# Patient Record
Sex: MALE | Race: WHITE | Employment: OTHER | URBAN - METROPOLITAN AREA
[De-identification: names, ages, dates, MRNs, and addresses within clinical notes are randomized per-mention and may not be internally consistent; named-entity substitution may affect disease eponyms.]

---

## 2021-02-25 NOTE — PATIENT DISCUSSION
"""Lid everted OS. No foreign body found today. Patient a little irritated. "" ""Continue Artificial tears both eyes four times a day . """

## 2021-07-07 NOTE — PATIENT DISCUSSION
"""Lid everted OS. No foreign body found today. Patient a little irritated. "" ""Continue Artificial tears both eyes four times a day . ""."

## 2021-07-12 NOTE — PATIENT DISCUSSION
Start PF tears OU BID or more as needed. Discussed if over the counter drops do not seen to improve VA while doing night time activities will consider starting Restasis. Discussion on the impact LASIK and dry eye can have on vision, glare/halos, blurry, and distorted vision.

## 2022-05-12 NOTE — PATIENT DISCUSSION
NOT VISUALLY SIGNIFICANT: Informed patient that their cataract is not visually significant or does not meet the criteria for cataract surgery. Recommend attention to cataract symptoms monitoring by regular examinations. Patient instructed to call with changes in vision. [employer] Walk in

## 2023-04-04 ENCOUNTER — NEW PATIENT (OUTPATIENT)
Dept: URBAN - METROPOLITAN AREA CLINIC 26 | Facility: CLINIC | Age: 63
End: 2023-04-04

## 2023-04-04 VITALS
HEART RATE: 71 BPM | WEIGHT: 188 LBS | BODY MASS INDEX: 29.51 KG/M2 | DIASTOLIC BLOOD PRESSURE: 83 MMHG | HEIGHT: 67 IN | SYSTOLIC BLOOD PRESSURE: 120 MMHG

## 2023-04-04 DIAGNOSIS — H21.02: ICD-10-CM

## 2023-04-04 DIAGNOSIS — H04.123: ICD-10-CM

## 2023-04-04 DIAGNOSIS — H34.12: ICD-10-CM

## 2023-04-04 DIAGNOSIS — E11.9: ICD-10-CM

## 2023-04-04 DIAGNOSIS — H40.9: ICD-10-CM

## 2023-04-04 PROCEDURE — 92250 FUNDUS PHOTOGRAPHY W/I&R: CPT

## 2023-04-04 PROCEDURE — 92134 CPTRZ OPH DX IMG PST SGM RTA: CPT

## 2023-04-04 PROCEDURE — 92285 EXTERNAL OCULAR PHOTOGRAPHY: CPT

## 2023-04-04 PROCEDURE — 99204 OFFICE O/P NEW MOD 45 MIN: CPT

## 2023-04-04 PROCEDURE — 92235 FLUORESCEIN ANGRPH MLTIFRAME: CPT

## 2023-04-04 ASSESSMENT — VISUAL ACUITY: OD_SC: 20/30+2

## 2023-04-04 ASSESSMENT — TONOMETRY
OS_IOP_MMHG: 44
OS_IOP_MMHG: 34
OD_IOP_MMHG: 12
OS_IOP_MMHG: 42